# Patient Record
Sex: MALE | Race: BLACK OR AFRICAN AMERICAN | Employment: PART TIME | URBAN - METROPOLITAN AREA
[De-identification: names, ages, dates, MRNs, and addresses within clinical notes are randomized per-mention and may not be internally consistent; named-entity substitution may affect disease eponyms.]

---

## 2019-08-28 PROBLEM — Z80.0 FAMILY HISTORY OF RECTAL CANCER: Status: ACTIVE | Noted: 2019-08-28

## 2019-09-09 PROBLEM — K21.9 GASTROESOPHAGEAL REFLUX DISEASE, ESOPHAGITIS PRESENCE NOT SPECIFIED: Status: ACTIVE | Noted: 2019-09-09

## 2019-09-09 PROBLEM — K21.9 LARYNGOPHARYNGEAL REFLUX (LPR): Status: ACTIVE | Noted: 2019-09-09

## 2021-03-25 ENCOUNTER — OFFICE VISIT (OUTPATIENT)
Dept: SLEEP CENTER | Age: 46
End: 2021-03-25
Attending: INTERNAL MEDICINE
Payer: MEDICAID

## 2021-03-25 DIAGNOSIS — R06.83 SNORING: ICD-10-CM

## 2021-03-25 DIAGNOSIS — G47.33 OSA (OBSTRUCTIVE SLEEP APNEA): ICD-10-CM

## 2021-03-25 DIAGNOSIS — G47.10 HYPERSOMNOLENCE: ICD-10-CM

## 2021-03-25 PROCEDURE — 95806 SLEEP STUDY UNATT&RESP EFFT: CPT

## 2021-03-26 NOTE — PROCEDURES
[address]       Accredited by the [facility] ([facility])    PATIENT'S NAME:        ELIDA SAMSON  ATTENDING PHYSICIAN:   Magda Brizuela M.D.   REFERRING PHYSICIAN:     PATIENT ACCOUNT #:     20 driving or operating heavy machinery while sleepy. 4.   The patient should avoid alcohol or sedatives prior to sleep.     Dictated By Stephanie Henriquez M.D.  d: 03/26/2021 14:57:47  t: 03/26/2021 16:02:39  Job [id number]  RN/    cc: Eboni VALENZUELA

## 2021-03-30 ENCOUNTER — ORDER TRANSCRIPTION (OUTPATIENT)
Dept: SLEEP CENTER | Age: 46
End: 2021-03-30

## 2021-03-30 DIAGNOSIS — G47.33 OBSTRUCTIVE SLEEP APNEA (ADULT) (PEDIATRIC): Primary | ICD-10-CM

## 2021-04-16 ENCOUNTER — LAB ENCOUNTER (OUTPATIENT)
Dept: LAB | Age: 46
End: 2021-04-16
Attending: INTERNAL MEDICINE
Payer: MEDICAID

## 2021-04-16 DIAGNOSIS — G47.33 OBSTRUCTIVE SLEEP APNEA (ADULT) (PEDIATRIC): ICD-10-CM

## 2021-04-19 ENCOUNTER — OFFICE VISIT (OUTPATIENT)
Dept: SLEEP CENTER | Age: 46
End: 2021-04-19
Attending: INTERNAL MEDICINE
Payer: MEDICAID

## 2021-04-19 DIAGNOSIS — G47.33 OSA (OBSTRUCTIVE SLEEP APNEA): ICD-10-CM

## 2021-04-19 PROCEDURE — 95811 POLYSOM 6/>YRS CPAP 4/> PARM: CPT
